# Patient Record
Sex: MALE | Race: BLACK OR AFRICAN AMERICAN | NOT HISPANIC OR LATINO | Employment: UNEMPLOYED | URBAN - METROPOLITAN AREA
[De-identification: names, ages, dates, MRNs, and addresses within clinical notes are randomized per-mention and may not be internally consistent; named-entity substitution may affect disease eponyms.]

---

## 2018-02-13 ENCOUNTER — HOSPITAL ENCOUNTER (EMERGENCY)
Facility: HOSPITAL | Age: 58
Discharge: LEFT BEFORE DISCHARGE | End: 2018-02-13
Attending: EMERGENCY MEDICINE
Payer: COMMERCIAL

## 2018-02-13 ENCOUNTER — APPOINTMENT (EMERGENCY)
Dept: CT IMAGING | Facility: HOSPITAL | Age: 58
End: 2018-02-13
Payer: COMMERCIAL

## 2018-02-13 ENCOUNTER — APPOINTMENT (EMERGENCY)
Dept: RADIOLOGY | Facility: HOSPITAL | Age: 58
End: 2018-02-13
Payer: COMMERCIAL

## 2018-02-13 VITALS
WEIGHT: 171.3 LBS | OXYGEN SATURATION: 97 % | SYSTOLIC BLOOD PRESSURE: 185 MMHG | HEART RATE: 97 BPM | TEMPERATURE: 100.4 F | RESPIRATION RATE: 18 BRPM | DIASTOLIC BLOOD PRESSURE: 99 MMHG | BODY MASS INDEX: 25.37 KG/M2 | HEIGHT: 69 IN

## 2018-02-13 DIAGNOSIS — R93.89 ABNORMAL CT SCAN: ICD-10-CM

## 2018-02-13 DIAGNOSIS — R50.9 FEVER: Primary | ICD-10-CM

## 2018-02-13 DIAGNOSIS — I10 HYPERTENSION: ICD-10-CM

## 2018-02-13 DIAGNOSIS — J20.9 ACUTE BRONCHITIS WITH BRONCHOSPASM: ICD-10-CM

## 2018-02-13 DIAGNOSIS — R91.1 PULMONARY NODULE: ICD-10-CM

## 2018-02-13 DIAGNOSIS — R01.1 HEART MURMUR: ICD-10-CM

## 2018-02-13 DIAGNOSIS — R79.89 ELEVATED BRAIN NATRIURETIC PEPTIDE (BNP) LEVEL: ICD-10-CM

## 2018-02-13 LAB
ALBUMIN SERPL BCP-MCNC: 3.5 G/DL (ref 3.5–5)
ALP SERPL-CCNC: 100 U/L (ref 46–116)
ALT SERPL W P-5'-P-CCNC: 32 U/L (ref 12–78)
ANION GAP SERPL CALCULATED.3IONS-SCNC: 12 MMOL/L (ref 4–13)
APTT PPP: 34 SECONDS (ref 23–35)
AST SERPL W P-5'-P-CCNC: 45 U/L (ref 5–45)
ATRIAL RATE: 104 BPM
BASOPHILS # BLD AUTO: 0.03 THOUSANDS/ΜL (ref 0–0.1)
BASOPHILS NFR BLD AUTO: 1 % (ref 0–1)
BILIRUB DIRECT SERPL-MCNC: 0.14 MG/DL (ref 0–0.2)
BILIRUB SERPL-MCNC: 0.4 MG/DL (ref 0.2–1)
BILIRUB UR QL STRIP: NEGATIVE
BUN SERPL-MCNC: 6 MG/DL (ref 5–25)
CALCIUM SERPL-MCNC: 9.3 MG/DL (ref 8.3–10.1)
CHLORIDE SERPL-SCNC: 98 MMOL/L (ref 100–108)
CLARITY UR: CLEAR
CO2 SERPL-SCNC: 22 MMOL/L (ref 21–32)
COLOR UR: YELLOW
CREAT SERPL-MCNC: 1.02 MG/DL (ref 0.6–1.3)
EOSINOPHIL # BLD AUTO: 0.01 THOUSAND/ΜL (ref 0–0.61)
EOSINOPHIL NFR BLD AUTO: 0 % (ref 0–6)
ERYTHROCYTE [DISTWIDTH] IN BLOOD BY AUTOMATED COUNT: 15.1 % (ref 11.6–15.1)
ETHANOL SERPL-MCNC: <3 MG/DL (ref 0–3)
GFR SERPL CREATININE-BSD FRML MDRD: 95 ML/MIN/1.73SQ M
GLUCOSE SERPL-MCNC: 107 MG/DL (ref 65–140)
GLUCOSE UR STRIP-MCNC: NEGATIVE MG/DL
HCT VFR BLD AUTO: 39.4 % (ref 36.5–49.3)
HGB BLD-MCNC: 13.5 G/DL (ref 12–17)
HGB UR QL STRIP.AUTO: NEGATIVE
INR PPP: 0.98 (ref 0.86–1.16)
KETONES UR STRIP-MCNC: NEGATIVE MG/DL
LACTATE SERPL-SCNC: 1.2 MMOL/L (ref 0.5–2)
LEUKOCYTE ESTERASE UR QL STRIP: NEGATIVE
LIPASE SERPL-CCNC: 212 U/L (ref 73–393)
LYMPHOCYTES # BLD AUTO: 0.73 THOUSANDS/ΜL (ref 0.6–4.47)
LYMPHOCYTES NFR BLD AUTO: 18 % (ref 14–44)
MCH RBC QN AUTO: 29.1 PG (ref 26.8–34.3)
MCHC RBC AUTO-ENTMCNC: 34.3 G/DL (ref 31.4–37.4)
MCV RBC AUTO: 85 FL (ref 82–98)
MONOCYTES # BLD AUTO: 0.28 THOUSAND/ΜL (ref 0.17–1.22)
MONOCYTES NFR BLD AUTO: 7 % (ref 4–12)
NEUTROPHILS # BLD AUTO: 3 THOUSANDS/ΜL (ref 1.85–7.62)
NEUTS SEG NFR BLD AUTO: 74 % (ref 43–75)
NITRITE UR QL STRIP: NEGATIVE
NRBC BLD AUTO-RTO: 0 /100 WBCS
NT-PROBNP SERPL-MCNC: 1967 PG/ML
P AXIS: 16 DEGREES
PH UR STRIP.AUTO: 6.5 [PH] (ref 4.5–8)
PLATELET # BLD AUTO: 173 THOUSANDS/UL (ref 149–390)
PMV BLD AUTO: 10.3 FL (ref 8.9–12.7)
POTASSIUM SERPL-SCNC: 3.6 MMOL/L (ref 3.5–5.3)
PR INTERVAL: 152 MS
PROT SERPL-MCNC: 7.4 G/DL (ref 6.4–8.2)
PROT UR STRIP-MCNC: NEGATIVE MG/DL
PROTHROMBIN TIME: 13.2 SECONDS (ref 12.1–14.4)
QRS AXIS: -1 DEGREES
QRSD INTERVAL: 82 MS
QT INTERVAL: 340 MS
QTC INTERVAL: 447 MS
RBC # BLD AUTO: 4.64 MILLION/UL (ref 3.88–5.62)
SODIUM SERPL-SCNC: 132 MMOL/L (ref 136–145)
SP GR UR STRIP.AUTO: <=1.005 (ref 1–1.03)
T WAVE AXIS: 87 DEGREES
TROPONIN I SERPL-MCNC: <0.02 NG/ML
UROBILINOGEN UR QL STRIP.AUTO: 0.2 E.U./DL
VENTRICULAR RATE: 104 BPM
WBC # BLD AUTO: 4.06 THOUSAND/UL (ref 4.31–10.16)

## 2018-02-13 PROCEDURE — 85610 PROTHROMBIN TIME: CPT | Performed by: PHYSICIAN ASSISTANT

## 2018-02-13 PROCEDURE — 85025 COMPLETE CBC W/AUTO DIFF WBC: CPT | Performed by: PHYSICIAN ASSISTANT

## 2018-02-13 PROCEDURE — 84484 ASSAY OF TROPONIN QUANT: CPT | Performed by: PHYSICIAN ASSISTANT

## 2018-02-13 PROCEDURE — 36415 COLL VENOUS BLD VENIPUNCTURE: CPT | Performed by: PHYSICIAN ASSISTANT

## 2018-02-13 PROCEDURE — 80048 BASIC METABOLIC PNL TOTAL CA: CPT | Performed by: PHYSICIAN ASSISTANT

## 2018-02-13 PROCEDURE — 81003 URINALYSIS AUTO W/O SCOPE: CPT | Performed by: PHYSICIAN ASSISTANT

## 2018-02-13 PROCEDURE — 74177 CT ABD & PELVIS W/CONTRAST: CPT

## 2018-02-13 PROCEDURE — 83880 ASSAY OF NATRIURETIC PEPTIDE: CPT | Performed by: PHYSICIAN ASSISTANT

## 2018-02-13 PROCEDURE — 93010 ELECTROCARDIOGRAM REPORT: CPT | Performed by: INTERNAL MEDICINE

## 2018-02-13 PROCEDURE — 71046 X-RAY EXAM CHEST 2 VIEWS: CPT

## 2018-02-13 PROCEDURE — 83690 ASSAY OF LIPASE: CPT | Performed by: PHYSICIAN ASSISTANT

## 2018-02-13 PROCEDURE — 96367 TX/PROPH/DG ADDL SEQ IV INF: CPT

## 2018-02-13 PROCEDURE — 87040 BLOOD CULTURE FOR BACTERIA: CPT | Performed by: PHYSICIAN ASSISTANT

## 2018-02-13 PROCEDURE — 96375 TX/PRO/DX INJ NEW DRUG ADDON: CPT

## 2018-02-13 PROCEDURE — 96366 THER/PROPH/DIAG IV INF ADDON: CPT

## 2018-02-13 PROCEDURE — 80076 HEPATIC FUNCTION PANEL: CPT | Performed by: PHYSICIAN ASSISTANT

## 2018-02-13 PROCEDURE — 71275 CT ANGIOGRAPHY CHEST: CPT

## 2018-02-13 PROCEDURE — 87798 DETECT AGENT NOS DNA AMP: CPT | Performed by: PHYSICIAN ASSISTANT

## 2018-02-13 PROCEDURE — 80320 DRUG SCREEN QUANTALCOHOLS: CPT | Performed by: PHYSICIAN ASSISTANT

## 2018-02-13 PROCEDURE — 93005 ELECTROCARDIOGRAM TRACING: CPT

## 2018-02-13 PROCEDURE — 99284 EMERGENCY DEPT VISIT MOD MDM: CPT

## 2018-02-13 PROCEDURE — 96365 THER/PROPH/DIAG IV INF INIT: CPT

## 2018-02-13 PROCEDURE — 83605 ASSAY OF LACTIC ACID: CPT | Performed by: PHYSICIAN ASSISTANT

## 2018-02-13 PROCEDURE — 85730 THROMBOPLASTIN TIME PARTIAL: CPT | Performed by: PHYSICIAN ASSISTANT

## 2018-02-13 RX ORDER — CEFDINIR 300 MG/1
300 CAPSULE ORAL EVERY 12 HOURS SCHEDULED
Qty: 20 CAPSULE | Refills: 0 | Status: SHIPPED | OUTPATIENT
Start: 2018-02-13 | End: 2018-02-23

## 2018-02-13 RX ORDER — ACETAMINOPHEN 325 MG/1
650 TABLET ORAL ONCE
Status: COMPLETED | OUTPATIENT
Start: 2018-02-13 | End: 2018-02-13

## 2018-02-13 RX ORDER — ONDANSETRON 2 MG/ML
4 INJECTION INTRAMUSCULAR; INTRAVENOUS ONCE
Status: COMPLETED | OUTPATIENT
Start: 2018-02-13 | End: 2018-02-13

## 2018-02-13 RX ORDER — PROMETHAZINE HYDROCHLORIDE AND CODEINE PHOSPHATE 6.25; 1 MG/5ML; MG/5ML
5 SYRUP ORAL EVERY 4 HOURS PRN
Qty: 120 ML | Refills: 0 | Status: SHIPPED | OUTPATIENT
Start: 2018-02-13 | End: 2018-02-23

## 2018-02-13 RX ORDER — LEVOFLOXACIN 750 MG/1
750 TABLET ORAL EVERY 24 HOURS
Qty: 7 TABLET | Refills: 0 | Status: SHIPPED | OUTPATIENT
Start: 2018-02-13 | End: 2018-02-20

## 2018-02-13 RX ORDER — ALBUTEROL SULFATE 90 UG/1
2 AEROSOL, METERED RESPIRATORY (INHALATION) EVERY 4 HOURS PRN
Qty: 1 INHALER | Refills: 0 | Status: SHIPPED | OUTPATIENT
Start: 2018-02-13

## 2018-02-13 RX ADMIN — SODIUM CHLORIDE 2331 ML: 0.9 INJECTION, SOLUTION INTRAVENOUS at 16:26

## 2018-02-13 RX ADMIN — IOHEXOL 100 ML: 350 INJECTION, SOLUTION INTRAVENOUS at 17:55

## 2018-02-13 RX ADMIN — ACETAMINOPHEN 650 MG: 325 TABLET, FILM COATED ORAL at 16:34

## 2018-02-13 RX ADMIN — ONDANSETRON 4 MG: 2 INJECTION INTRAMUSCULAR; INTRAVENOUS at 16:36

## 2018-02-13 RX ADMIN — CEFTRIAXONE 1000 MG: 1 INJECTION, SOLUTION INTRAVENOUS at 16:39

## 2018-02-13 RX ADMIN — HYDROMORPHONE HYDROCHLORIDE 0.2 MG: 1 INJECTION, SOLUTION INTRAMUSCULAR; INTRAVENOUS; SUBCUTANEOUS at 16:38

## 2018-02-13 RX ADMIN — AZITHROMYCIN MONOHYDRATE 500 MG: 500 INJECTION, POWDER, LYOPHILIZED, FOR SOLUTION INTRAVENOUS at 17:26

## 2018-02-13 NOTE — ED PROVIDER NOTES
History  Chief Complaint   Patient presents with    Flu Symptoms     Patient c/o cough over the last several weeks  Patient c/o flu symptoms and generalized body aches  Patient denies having a flu shot  53-year-old male with no significant past medical history presents to the emergency department with chief complaint of cough, congestion and flu-like symptoms over the past 6 weeks  Onset of symptoms reported as 6 weeks ago  Location of symptoms reported primarily as the chest   Quality was reported as cough with congestion  Severity reported as moderate  Associated symptoms:  Positive for cough  Positive for myalgias  Positive for tactile fevers  Positive for chills  Positive for nausea and vomiting  Positive for diarrhea  Positive for headaches  Positive for sore throat  Denies joint swelling or redness  Modifying factors:  Patient reports supportive care including Tylenol at home has not improved symptoms  Context:  Patient reports positive sick contacts at work recently  Denies recent travel outside the country  A friend who accompanied patient to the emergency department reports that the patient has been drinking alcohol daily and continues to drink alcohol  He thinks this may be related to his current symptoms  Medical summary:  Review of past visits via [place] demonstrates no prior visits to this emergency department  History provided by:  Patient and friend   used: No    Flu Symptoms   Presenting symptoms: cough, diarrhea, fatigue, fever, headache, myalgias, nausea, rhinorrhea, shortness of breath, sore throat and vomiting    Associated symptoms: chills and nasal congestion    Associated symptoms: no ear pain and no neck stiffness        None       History reviewed  No pertinent past medical history  History reviewed  No pertinent surgical history  History reviewed  No pertinent family history    I have reviewed and agree with the history as documented  Social History   Substance Use Topics    Smoking status: Former Smoker    Smokeless tobacco: Never Used    Alcohol use No        Review of Systems   Constitutional: Positive for appetite change, chills, diaphoresis, fatigue and fever  Negative for activity change and unexpected weight change  HENT: Positive for congestion, postnasal drip, rhinorrhea, sinus pressure and sore throat  Negative for dental problem, drooling, ear discharge, ear pain, facial swelling, hearing loss, mouth sores, nosebleeds, sinus pain, sneezing, tinnitus, trouble swallowing and voice change  Eyes: Negative for photophobia, pain, discharge, redness, itching and visual disturbance  Respiratory: Positive for cough, chest tightness, shortness of breath and wheezing  Negative for apnea, choking and stridor  Cardiovascular: Negative for chest pain, palpitations and leg swelling  Gastrointestinal: Positive for diarrhea, nausea and vomiting  Negative for abdominal distention, abdominal pain, anal bleeding, blood in stool, constipation and rectal pain  Endocrine: Negative for cold intolerance, heat intolerance, polydipsia, polyphagia and polyuria  Genitourinary: Negative for difficulty urinating, dysuria, flank pain, frequency, hematuria and urgency  Musculoskeletal: Positive for arthralgias and myalgias  Negative for back pain, gait problem, joint swelling, neck pain and neck stiffness  Skin: Negative for color change, pallor, rash and wound  Allergic/Immunologic: Negative for environmental allergies, food allergies and immunocompromised state  Neurological: Positive for weakness and headaches  Negative for dizziness, tremors, seizures, syncope, facial asymmetry, speech difficulty, light-headedness and numbness  Hematological: Negative for adenopathy  Does not bruise/bleed easily  Psychiatric/Behavioral: Negative for agitation, confusion and hallucinations  The patient is nervous/anxious      All other systems reviewed and are negative  Physical Exam  ED Triage Vitals [02/13/18 1550]   Temperature Pulse Respirations Blood Pressure SpO2   100 4 °F (38 °C) (!) 121 20 (!) 190/102 99 %      Temp Source Heart Rate Source Patient Position - Orthostatic VS BP Location FiO2 (%)   Oral Monitor Lying Right arm --      Pain Score       --           Orthostatic Vital Signs  Vitals:    02/13/18 1550 02/13/18 1642 02/13/18 1729   BP: (!) 190/102 (!) 188/86 (!) 185/99   Pulse: (!) 121 102 97   Patient Position - Orthostatic VS: Lying Lying Lying       Physical Exam   Constitutional: He is oriented to person, place, and time  He appears well-developed and well-nourished  No distress  BP (!) 188/86 (BP Location: Left arm)   Pulse 102   Temp 100 4 °F (38 °C) (Oral)   Resp 20   Ht 5' 9" (1 753 m)   Wt 77 7 kg (171 lb 4 8 oz)   SpO2 100%   BMI 25 30 kg/m²     Blood pressure hypertensive  Pulse tachycardic  Patient afebrile  Patient observed  IV fluids ordered  HENT:   Head: Normocephalic and atraumatic  Right Ear: External ear normal    Left Ear: External ear normal    Nose: Nose normal    Mouth/Throat: No oropharyngeal exudate  Mucus membranes are dry, pink, yellow mucus drainage noted to posterior pharynx  Uvula midline without deviation  Eyes: Conjunctivae and EOM are normal  Pupils are equal, round, and reactive to light  Right eye exhibits no discharge  Left eye exhibits no discharge  No scleral icterus  Neck: Normal range of motion  Neck supple  No tracheal deviation present  No thyromegaly present  Cardiovascular: S1 normal, S2 normal and intact distal pulses  Tachycardia present  Murmur heard   + MACI Left lower sternal border  3/4 murmer   Pulmonary/Chest: Effort normal  No stridor  No respiratory distress  He has wheezes  He has rales  He exhibits no tenderness  Breath sounds are present bilaterally  Coarse rhonchi with rales auscultated bilaterally    Expiratory wheezing noted scattered bilaterally  Abdominal: Soft  Bowel sounds are normal  He exhibits no distension and no mass  There is no tenderness  There is no rebound and no guarding  No hernia  Musculoskeletal: He exhibits tenderness  He exhibits no edema or deformity  Diffuse tenderness to the muscles of the upper and lower back as well as bilateral lower extremities  Lymphadenopathy:     He has no cervical adenopathy  Neurological: He is alert and oriented to person, place, and time  He displays normal reflexes  No cranial nerve deficit or sensory deficit  He exhibits normal muscle tone  Coordination normal    Skin: Skin is warm and dry  Capillary refill takes less than 2 seconds  No rash noted  He is not diaphoretic  No erythema  No pallor  Psychiatric: He has a normal mood and affect  His behavior is normal  Judgment and thought content normal    Nursing note and vitals reviewed        ED Medications  Medications   sodium chloride 0 9 % bolus 2,331 mL (0 mL/kg × 77 7 kg Intravenous Stopped 2/13/18 1902)   acetaminophen (TYLENOL) tablet 650 mg (650 mg Oral Given 2/13/18 1634)   ondansetron (ZOFRAN) injection 4 mg (4 mg Intravenous Given 2/13/18 1636)   HYDROmorphone (DILAUDID) injection 0 2 mg (0 2 mg Intravenous Given 2/13/18 1638)   ceftriaxone (ROCEPHIN) 1 g/50 mL in dextrose IVPB (0 mg Intravenous Stopped 2/13/18 1709)   azithromycin (ZITHROMAX) 500 mg in sodium chloride 0 9% 250mL IVPB 500 mg (0 mg Intravenous Stopped 2/13/18 1902)   iohexol (OMNIPAQUE) 350 MG/ML injection (MULTI-DOSE) 100 mL (100 mL Intravenous Given 2/13/18 1755)       Diagnostic Studies  Results Reviewed     Procedure Component Value Units Date/Time    Lipase [[id number]]  (Normal) Collected:  02/13/18 1618    Lab Status:  Final result Specimen:  Blood from Arm, Right Updated:  02/13/18 1719     Lipase 212 u/L     Basic metabolic panel [[id number]]  (Abnormal) Collected:  02/13/18 1618    Lab Status:  Final result Specimen:  Blood from Arm, Right Updated:  02/13/18 1708     Sodium 132 (L) mmol/L      Potassium 3 6 mmol/L      Chloride 98 (L) mmol/L      CO2 22 mmol/L      Anion Gap 12 mmol/L      BUN 6 mg/dL      Creatinine 1 02 mg/dL      Glucose 107 mg/dL      Calcium 9 3 mg/dL      eGFR 95 ml/min/1 73sq m     Narrative:         National Kidney Disease Education Program recommendations are as follows:  GFR calculation is accurate only with a steady state creatinine  Chronic Kidney disease less than 60 ml/min/1 73 sq  meters  Kidney failure less than 15 ml/min/1 73 sq  meters  Hepatic function panel [[id number]]  (Normal) Collected:  02/13/18 1618    Lab Status:  Final result Specimen:  Blood from Arm, Right Updated:  02/13/18 1708     Total Bilirubin 0 40 mg/dL      Bilirubin, Direct 0 14 mg/dL      Alkaline Phosphatase 100 U/L      AST 45 U/L      ALT 32 U/L      Total Protein 7 4 g/dL      Albumin 3 5 g/dL     B-type natriuretic peptide [[id number]]  (Abnormal) Collected:  02/13/18 1618    Lab Status:  Final result Specimen:  Blood from Arm, Right Updated:  02/13/18 1708     NT-proBNP 1,967 (H) pg/mL     Troponin I [[id number]]  (Normal) Collected:  02/13/18 1618    Lab Status:  Final result Specimen:  Blood from Arm, Right Updated:  02/13/18 1658     Troponin I <0 02 ng/mL     Narrative:         [employer] Chemistry analyzer 99% cutoff is > 0 04 ng/mL in network labs    o cTnI 99% cutoff is useful only when applied to patients in the clinical setting of myocardial ischemia  o cTnI 99% cutoff should be interpreted in the context of clinical history, ECG findings and possibly cardiac imaging to establish correct diagnosis  o cTnI 99% cutoff may be suggestive but clearly not indicative of a coronary event without the clinical setting of myocardial ischemia      Lactic acid, plasma [[id number]]  (Normal) Collected:  02/13/18 1618    Lab Status:  Final result Specimen:  Blood from Arm, Right Updated:  02/13/18 1657     LACTIC ACID 1 2 mmol/L     Narrative: Result may be elevated if tourniquet was used during collection  Ethanol [[id number]]  (Normal) Collected:  02/13/18 1618    Lab Status:  Final result Specimen:  Blood from Arm, Right Updated:  02/13/18 1649     Ethanol Lvl <3 mg/dL     Protime-INR [[id number]]  (Normal) Collected:  02/13/18 1618    Lab Status:  Final result Specimen:  Blood from Arm, Right Updated:  02/13/18 1648     Protime 13 2 seconds      INR 0 98    APTT [[id number]]  (Normal) Collected:  02/13/18 1618    Lab Status:  Final result Specimen:  Blood from Arm, Right Updated:  02/13/18 1648     PTT 34 seconds     Narrative: Therapeutic Heparin Range = 60-90 seconds    CBC and differential [[id number]]  (Abnormal) Collected:  02/13/18 1618    Lab Status:  Final result Specimen:  Blood from Arm, Right Updated:  02/13/18 1634     WBC 4 06 (L) Thousand/uL      RBC 4 64 Million/uL      Hemoglobin 13 5 g/dL      Hematocrit 39 4 %      MCV 85 fL      MCH 29 1 pg      MCHC 34 3 g/dL      RDW 15 1 %      MPV 10 3 fL      Platelets 143 Thousands/uL      nRBC 0 /100 WBCs      Neutrophils Relative 74 %      Lymphocytes Relative 18 %      Monocytes Relative 7 %      Eosinophils Relative 0 %      Basophils Relative 1 %      Neutrophils Absolute 3 00 Thousands/µL      Lymphocytes Absolute 0 73 Thousands/µL      Monocytes Absolute 0 28 Thousand/µL      Eosinophils Absolute 0 01 Thousand/µL      Basophils Absolute 0 03 Thousands/µL     Blood culture #2 [[id number]] Collected:  02/13/18 1624    Lab Status: In process Specimen:  Blood from Arm, Left Updated:  02/13/18 1632    Blood culture #1 [[id number]] Collected:  02/13/18 1618    Lab Status: In process Specimen:  Blood from Arm, Right Updated:  02/13/18 1632    Influenza A/B and RSV by PCR (indicated for patients >2 mo of age) [[id number]] Collected:  02/13/18 1624    Lab Status:   In process Specimen:  Nasopharyngeal from Nasopharyngeal Swab Updated:  02/13/18 1631    UA w Reflex to Microscopic w Reflex to Culture [[id number]]     Lab Status:  No result Specimen:  Urine                  PE Study with CT Abdomen and Pelvis with contrast   Final Result by Anshul Purcell MD (02/13 1813)         1  No acute cardiopulmonary process  2   Indeterminate 4 mm pulmonary nodule in the right middle lobe Based on current Fleischner Society 2017 Guidelines on incidental pulmonary nodule, no routine follow-up is needed if the patient is considered low risk for lung cancer  If the patient is    considered high risk for lung cancer, 12 month follow-up non-contrast chest CT is recommended  3   No acute intra-abdominal or pelvic process  Workstation performed: [id number]         XR chest 2 views   Final Result by Vahe Sanchez MD (02/13 1703)      No active pulmonary disease  Prominent right paratracheal stripe could be from brachiocephalic vessels or related to the thyroid, consider nonemergent outpatient lower neck ultrasound        Workstation performed: [id number]                    Procedures  ECG 12 Lead Documentation  Date/Time: 2/13/2018 4:42 PM  Performed by: Sebastián Price  Authorized by: [facility]     Indications / Diagnosis:  Tachycardia  ECG reviewed by me, the ED Provider: yes    Patient location:  ED  Previous ECG:     Previous ECG:  Unavailable    Comparison to cardiac monitor: Yes    Interpretation:     Interpretation: normal    Rate:     ECG rate:  104    ECG rate assessment: tachycardic    Rhythm:     Rhythm: sinus tachycardia    Ectopy:     Ectopy: none    QRS:     QRS axis:  Normal    QRS intervals:  Normal  Conduction:     Conduction: normal    ST segments:     ST segments:  Normal  T waves:     T waves: normal    Other findings:     Other findings: LVH               Phone Contacts  ED Phone Contact    ED Course  ED Course                                MDM  Number of Diagnoses or Management Options  Diagnosis management comments: Differential diagnosis includes but is not limited to influenza, dehydration, pneumonia, bronchitis, delirium tremens, withdrawal, cirrhosis, pleural effusion, pericardial effusion, pulmonary edema pulmonary embolism, endocarditis,  consider atypical pneumonia;    planned workup including labs, IV fluids, chest x-ray, EKG  Lab results reviewed  CBC remarkable for white blood cell count of 4 0 which is slightly low  Hemoglobin and hematocrit are normal   INR normal at 0 98  Basic metabolic panel reviewed remarkable for sodium slightly low at 132  Chloride low at 98  Normal BUN and creatinine  Hepatic function panel reviewed and unremarkable  Troponin normal at less than 0 02  BNP is elevated 1967  Ethanol is normal less than 3  Lipase normal at 212  Chest x-ray images independently visualized and interpreted by me  No acute infiltrate or pneumothorax  Radiology report was reviewed demonstrating No active pulmonary disease  Prominent right paratracheal stripe could be from brachiocephalic vessels or related to the thyroid, consider nonemergent outpatient lower neck ultrasound  Will proceed with CT scan of the chest abdomen and pelvis for further evaluation given patient's extensive symptoms over the past 6 weeks, abnormal vital signs on arrival including tachycardia and concerns for possible pulmonary embolism  CT scan of the chest abdomen and pelvis images visualized by me  Radiology report was reviewed:PULMONARY ARTERIAL TREE:  No pulmonary embolus is seen  LUNGS:  Indeterminate 4 mm pulmonary nodule in the right middle lobe adjacent to the minor fissure   No airspace consolidation, pulmonary edema or pneumothorax   There is no tracheal or endobronchial lesion  PLEURA:  Unremarkable  HEART/AORTA:  Unremarkable for patient's age  MEDIASTINUM AND LINDA:  Unremarkable  CHEST WALL AND LOWER NECK: Normal     ABDOMEN    LIVER/BILIARY TREE:  No biliary obstruction  GALLBLADDER:  No calcified gallstones   No pericholecystic inflammatory change  SPLEEN:  Unremarkable  PANCREAS:  Unremarkable  ADRENAL GLANDS: Unremarkable  KIDNEYS/URETERS:  No evidence of pyelonephritis or obstructive uropathy  STOMACH AND BOWEL:  No bowel obstruction, colitis or diverticulitis  APPENDIX:  A normal appendix was visualized  ABDOMINOPELVIC CAVITY:  No ascites or free intraperitoneal air  No lymphadenopathy  VESSELS:  Unremarkable for patient's age  PELVIS    REPRODUCTIVE ORGANS:  Unremarkable for patient's age  URINARY BLADDER:  Unremarkable  ABDOMINAL WALL/INGUINAL REGIONS:  Unremarkable  OSSEOUS STRUCTURES:  No acute fracture or destructive osseous lesion  1852: Re-evaluation I reviewed patient's workup including CT scan results, chest x-ray results, EKG and lab work with the patient  ED course:  Patient received 30 cc/kilogram of IV fluids in addition to initial antibiotic coverage for concerns of sepsis  Initial workup remarkable for significantly elevated BNP at 1967  Normal lactate, normal troponin  Patient does have audible murmur on examination  He reports no history of murmur in the past   He denies any IV drug abuse  His symptoms seem to be too prolonged for influenza although this is in the differential   No acute pneumonia or other source for infection noted on workup  After IV fluid administration patient was still tachycardic at 104  Febrile to 100 4 in the emergency department  Patient remains hypertensive at 190/102  His oxygen saturations 99% on room air  I discussed with patient given the no source for symptoms identified in addition to fever and no murmur that I was concerned about possible endocarditis  I discussed with patient my recommendation of admission to the hospital for further workup and evaluation of source of fevers and symptoms specifically given elevated BNP    Patient declined admission to the hospital stating that he felt that he needed to go home that he could not stay  He states he needs to be home in order to take care of his family  I discussed with him the risks of leaving against medical advice including but not limited to death, CVA, TIA, myocardial infarction, pulmonary infarction, sepsis, disability, acute chronic pain and death  Patient verbalized understanding of risks of leaving AMA  He demonstrated clinical competence to understand these risks during his time here in the emergency department  I have discussed with him will treat with outpatient antibiotics and have him follow up with primary care physician as soon as possible  I discussed with him that he can return at any time for completion of his workup and admission for further evaluation of symptoms  I did discuss with him at this time that he does have 2 blood cultures and a flu test still pending at the time of discharge and he will receive a phone call if these return positive  Discussed outpatient follow-up with primary care physician and Cardiology  I again discussed with patient he should return at any time for any reason for further evaluation and completion of workup and admission  Patient verbalized understanding of these instructions and agreement with plan but still wishes to leave against medical advice         Amount and/or Complexity of Data Reviewed  Clinical lab tests: ordered and reviewed  Tests in the radiology section of CPT®: ordered and reviewed  Tests in the medicine section of CPT®: ordered and reviewed  Discussion of test results with the performing providers: yes  Obtain history from someone other than the patient: yes (Friend at bedside)  Review and summarize past medical records: yes  Independent visualization of images, tracings, or specimens: yes    Patient Progress  Patient progress: (unchanged)    CritCare Time    Disposition  Final diagnoses:   Fever   Heart murmur   Hypertension   Elevated brain natriuretic peptide (BNP) level   Abnormal CT scan   Pulmonary nodule   Acute bronchitis with bronchospasm     Time reflects when diagnosis was documented in both MDM as applicable and the Disposition within this note     Time User Action Codes Description Comment    2/13/2018  6:57 PM Rivera Neat Add [R50 9] Fever     2/13/2018  6:57 PM Rivera Neat Add [R01 1] Heart murmur     2/13/2018  6:57 PM Galilea Clayton [I10] Hypertension     2/13/2018  6:57 PM Rivera Neat Add [R79 89] Elevated brain natriuretic peptide (BNP) level     2/13/2018  6:58 PM Rivera Neat Add [R93 8] Abnormal CT scan     2/13/2018  6:58 PM Rivera Neat Add [R91 1] Pulmonary nodule     2/13/2018  7:08 PM Rivera Neat Add [J20 9] Acute bronchitis with bronchospasm       ED Disposition     ED Disposition Condition Comment    AMA  Date: 2/13/2018  Patient: Varghese Thompson  Admitted: 2/13/2018  3:41 PM  Attending Provider: Jagjit Barth MD    Varghese Thompson or his authorized caregiver has made the decision for the patient to leave the emergency department against the [facility]  He or his authorized caregiver has been informed and understands the inherent risks, including but not limited to death, disability, stroke, heart attack, sepsis  He or his authorized caregiver has decided to accept the responsibility for  this decision  [place] and all necessary parties have been advised that he may return for further evaluation or treatment  His condition at time of discharge was guarded    Thomes South had current vital signs as follows:  BP (!) 185/99 (BP Location: L eft arm)   Pulse 97   Temp 100 4 °F (38 °C) (Oral)   Resp 18   Ht 5' 9" (1 753 m)   Wt 77 7 kg (171 lb 4 8 oz)         Follow-up Information     Follow up With Specialties Details Why Contact Info Additional Information    Choco Null DO Family Medicine Call in 1 day for further evaluation of symptoms [id number] [address] Emergency Department Emergency Medicine Go to for further evaluation of symptoms [address] [facility]  [address] [facility] [address]  [address] [place] [address] ED, [address] [place], [place], [address]    Rico Snider MD Cardiology Call in 1 day for further evaluation of symptoms [address] [facility] [address] [facility] Emergency Department Emergency Medicine Go to If symptoms worsen, for further evaluation of symptoms [address] [facility]  Christa Culver  [facility] [address]  [phone] [place] ED, [address] [place], [place], [address]        Patient's Medications   Discharge Prescriptions    ALBUTEROL (PROVENTIL HFA,VENTOLIN HFA) 90 MCG/ACT INHALER    Inhale 2 puffs every 4 (four) hours as needed for wheezing       Start Date: 2/13/2018 End Date: --       Order Dose: 2 puffs       Quantity: 1 Inhaler    Refills: 0    CEFDINIR (OMNICEF) 300 MG CAPSULE    Take 1 capsule (300 mg total) by mouth every 12 (twelve) hours for 10 days       Start Date: 2/13/2018 End Date: 2/23/2018       Order Dose: 300 mg       Quantity: 20 capsule    Refills: 0    LEVOFLOXACIN (LEVAQUIN) 750 MG TABLET    Take 1 tablet (750 mg total) by mouth every 24 hours for 7 days       Start Date: 2/13/2018 End Date: 2/20/2018       Order Dose: 750 mg       Quantity: 7 tablet    Refills: 0    PROMETHAZINE-CODEINE (PHENERGAN WITH CODEINE) 6 25-10 MG/5 ML SYRUP    Take 5 mL by mouth every 4 (four) hours as needed for cough for up to 10 days       Start Date: 2/13/2018 End Date: 2/23/2018       Order Dose: 5 mL       Quantity: 120 mL    Refills: 0     No discharge procedures on file      ED Provider  Electronically Signed by           Salomón Mathews PA-C  02/13/18 2678

## 2018-02-14 LAB
FLUAV AG SPEC QL: DETECTED
FLUBV AG SPEC QL: ABNORMAL
RSV B RNA SPEC QL NAA+PROBE: ABNORMAL

## 2018-02-18 LAB
BACTERIA BLD CULT: NORMAL
BACTERIA BLD CULT: NORMAL

## 2019-12-19 NOTE — DISCHARGE INSTRUCTIONS
Acute Bronchitis   WHAT YOU NEED TO KNOW:   Acute bronchitis is swelling and irritation in the air passages of your lungs  This irritation may cause you to cough or have other breathing problems  Acute bronchitis often starts because of another illness, such as a cold or the flu  The illness spreads from your nose and throat to your windpipe and airways  Bronchitis is often called a chest cold  Acute bronchitis lasts about 3 to 6 weeks and is usually not a serious illness  Your cough can last for several weeks  DISCHARGE INSTRUCTIONS:   Return to the emergency department if:   · You cough up blood  · Your lips or fingernails turn blue  · You feel like you are not getting enough air when you breathe  Contact your healthcare provider if:   · You have a fever  · Your breathing problems do not go away or get worse  · Your cough does not get better within 4 weeks  · You have questions or concerns about your condition or care  Self-care:   · Get more rest   Rest helps your body to heal  Slowly start to do more each day  Rest when you feel it is needed  · Avoid irritants in the air  Avoid chemicals, fumes, and dust  Wear a face mask if you must work around dust or fumes  Stay inside on days when air pollution levels are high  If you have allergies, stay inside when pollen counts are high  Do not use aerosol products, such as spray-on deodorant, bug spray, and hair spray  · Do not smoke or be around others who smoke  Nicotine and other chemicals in cigarettes and cigars damages the cilia that move mucus out of your lungs  Ask your healthcare provider for information if you currently smoke and need help to quit  E-cigarettes or smokeless tobacco still contain nicotine  Talk to your healthcare provider before you use these products  · Drink liquids as directed  Liquids help keep your air passages moist and help you cough up mucus   You may need to drink more liquids when Refill approved as requested.   you have acute bronchitis  Ask how much liquid to drink each day and which liquids are best for you  · Use a humidifier or vaporizer  Use a cool mist humidifier or a vaporizer to increase air moisture in your home  This may make it easier for you to breathe and help decrease your cough  Decrease risk for acute bronchitis:   · Get the vaccinations you need  Ask your healthcare provider if you should get vaccinated against the flu or pneumonia  · Prevent the spread of germs  You can decrease your risk of acute bronchitis and other illnesses by doing the following:     [facility] your hands often with soap and water  Carry germ-killing hand lotion or gel with you  You can use the lotion or gel to clean your hands when soap and water are not available  ¨ Do not touch your eyes, nose, or mouth unless you have washed your hands first     ¨ Always cover your mouth when you cough to prevent the spread of germs  It is best to cough into a tissue or your shirt sleeve instead of into your hand  Ask those around you cover their mouths when they cough  ¨ Try to avoid people who have a cold or the flu  If you are sick, stay away from others as much as possible  Medicines: Your healthcare provider may  give you any of the following:  · Ibuprofen or acetaminophen  are medicines that help lower your fever  They are available without a doctor's order  Ask your healthcare provider which medicine is right for you  Ask how much to take and how often to take it  Follow directions  These medicines can cause stomach bleeding if not taken correctly  Ibuprofen can cause kidney damage  Do not take ibuprofen if you have kidney disease, an ulcer, or allergies to aspirin  Acetaminophen can cause liver damage  Do not take more than 4,000 milligrams in 24 hours  · Decongestants  help loosen mucus in your lungs and make it easier to cough up  This can help you breathe easier  · Cough suppressants  decrease your urge to cough  If your cough produces mucus, do not take a cough suppressant unless your healthcare provider tells you to  Your healthcare provider may suggest that you take a cough suppressant at night so you can rest     · Inhalers  may be given  Your healthcare provider may give you one or more inhalers to help you breathe easier and cough less  An inhaler gives your medicine to open your airways  Ask your healthcare provider to show you how to use your inhaler correctly  · Take your medicine as directed  Contact your healthcare provider if you think your medicine is not helping or if you have side effects  Tell him of her if you are allergic to any medicine  Keep a list of the medicines, vitamins, and herbs you take  Include the amounts, and when and why you take them  Bring the list or the pill bottles to follow-up visits  Carry your medicine list with you in case of an emergency  Follow up with your healthcare provider as directed:  Write down questions you have so you will remember to ask them during your follow-up visits  © 2017 [address]  Information is for End User's use only and may not be sold, redistributed or otherwise used for commercial purposes  All illustrations and images included in CareNotes® are the copyrighted property of [employer]  or [employer]  The above information is an  only  It is not intended as medical advice for individual conditions or treatments  Talk to your doctor, nurse or pharmacist before following any medical regimen to see if it is safe and effective for you  Fever in Adults   WHAT YOU NEED TO KNOW:   A fever is an increase in your body temperature  Normal body temperature is 98 6°F (37°C)  Fever is generally defined as greater than 100 4°F (38°C)  Common causes include an infection, injury, or disease such as arthritis    DISCHARGE INSTRUCTIONS:   Return to the emergency department if:   · Your fever does not go away or gets worse even after treatment  · You have a stiff neck and a bad headache  · You are confused  You may not be able to think clearly or remember things like you normally do  · Your heart beats faster than usual even after treatment  · You have shortness of breath or chest pain when you breathe  · You urinate small amounts or not at all  · Your skin, lips, or nails turn blue  Contact your healthcare provider if:   · You have abdominal pain or you feel bloated  · You have nausea or are vomiting  · You have pain or burning when you urinate, or you have pain in your back  · You have questions or concerns about your condition or care  Medicines: You may need any of the following:  · NSAIDs , such as ibuprofen, help decrease swelling, pain, and fever  This medicine is available with or without a doctor's order  NSAIDs can cause stomach bleeding or kidney problems in certain people  If you take blood thinner medicine, always ask if NSAIDs are safe for you  Always read the medicine label and follow directions  Do not give these medicines to children under 10months of age without direction from your child's healthcare provider  · Acetaminophen  decreases pain and fever  It is available without a doctor's order  Ask how much to take and how often to take it  Follow directions  Read the labels of all other medicines you are using to see if they also contain acetaminophen, or ask your doctor or pharmacist  Acetaminophen can cause liver damage if not taken correctly  Do not use more than 4 grams (4,000 milligrams) total of acetaminophen in one day  · Antibiotics  may be given if you have an infection caused by bacteria  · Take your medicine as directed  Contact your healthcare provider if you think your medicine is not helping or if you have side effects  Tell him of her if you are allergic to any medicine  Keep a list of the medicines, vitamins, and herbs you take   Include the amounts, and when and why you take them  Bring the list or the pill bottles to follow-up visits  Carry your medicine list with you in case of an emergency  Follow up with your healthcare provider as directed:  Write down your questions so you remember to ask them during your visits  Self-care:   · Drink more liquids as directed  A fever makes you sweat  This can increase your risk for dehydration  Liquids can help prevent dehydration  ¨ Drink at least 6 to 8 eight-ounce cups of clear liquids each day  Drink water, juice, or broth  Do not drink sports drinks  They may contain caffeine  ¨ Ask your healthcare provider if you should drink an oral rehydration solution (ORS)  An ORS has the right amounts of water, salts, and sugar you need to replace body fluids  · Dress in lightweight clothes  Shivers may be a sign that your fever is rising  Do not put extra blankets or clothes on  This may cause your fever to rise even higher  Dress in light, comfortable clothing  Use a lightweight blanket or sheet when you sleep  Change your clothes, blanket, or sheets if they get wet  · Cool yourself safely  Take a bath in cool or lukewarm water  Use an ice pack wrapped in a small towel or wet a washcloth with cool water  Place the ice pack or wet washcloth on your forehead or the back of your neck  © 2017 [facility] Information is for End User's use only and may not be sold, redistributed or otherwise used for commercial purposes  All illustrations and images included in CareNotes® are the copyrighted property of [employer]  or [employer]  The above information is an  only  It is not intended as medical advice for individual conditions or treatments  Talk to your doctor, nurse or pharmacist before following any medical regimen to see if it is safe and effective for you        Heart Murmur   WHAT YOU NEED TO KNOW:   A heart murmur is a sound heard between your heartbeats  A murmur may sound like a swish or whoosh noise  Heart murmurs may be soft or loud  Heart murmurs are common and are usually harmless  DISCHARGE INSTRUCTIONS:   Call 911 for any of the following:   · You have chest pain  · You have trouble breathing  Return to the emergency department if:   · You feel dizzy, lightheaded, or faint  · Your child has chest pain with exercise  · Your fingertips or lips are pale or blue  · You have palpitations, or a fast heartbeat  · You have sudden swelling in your limbs or weight gain  Contact your healthcare provider if:   · Your child has a poor appetite or will not eat  · You have a fever  · You have shortness of breath with activity  · You sweat heavily with little or no activity  · You have questions or concerns about your condition or care  Follow up with your healthcare provider as directed: You may be referred to a cardiologist  Write down your questions so you remember to ask them during your visits  Return to your usual activities as directed: You may be able to return to sports or other usual activities  Ask your healthcare provider if you have any restrictions  © 2017 [address] [facility] Information is for End User's use only and may not be sold, redistributed or otherwise used for commercial purposes  All illustrations and images included in CareNotes® are the copyrighted property of [employer]  or [employer]  The above information is an  only  It is not intended as medical advice for individual conditions or treatments  Talk to your doctor, nurse or pharmacist before following any medical regimen to see if it is safe and effective for you  Hypertension   WHAT YOU NEED TO KNOW:   Hypertension is high blood pressure (BP)  Your BP is the force of your blood moving against the walls of your arteries  Normal BP is less than 120/80  Prehypertension is between 120/80 and 139/89  Hypertension is 140/90 or higher  Hypertension causes your BP to get so high that your heart has to work much harder than normal  This can damage your heart  You can control hypertension with a healthy lifestyle or medicines  A controlled blood pressure helps protect your organs, such as your heart, lungs, brain, and kidneys  DISCHARGE INSTRUCTIONS:   Call 911 for any of the following:   · You have discomfort in your chest that feels like squeezing, pressure, fullness, or pain  · You become confused or have difficulty speaking  · You suddenly feel lightheaded or have trouble breathing  · You have pain or discomfort in your back, neck, jaw, stomach, or arm  Return to the emergency department if:   · You have a severe headache or vision loss  · You have weakness in an arm or leg  Contact your healthcare provider if:   · You feel faint, dizzy, confused, or drowsy  · You have been taking your BP medicine and your BP is still higher than your healthcare provider says it should be  · You have questions or concerns about your condition or care  Medicines: You may  need any of the following:  · Medicine  may be used to help lower your BP  You may need more than one type of medicine  Take the medicine exactly as directed  · Diuretics  help decrease extra fluid that collects in your body  This will help lower your BP  You may urinate more often while you take this medicine  · Cholesterol medicine  helps lower your cholesterol level  A low cholesterol level helps prevent heart disease and makes it easier to control your blood pressure  · Take your medicine as directed  Contact your healthcare provider if you think your medicine is not helping or if you have side effects  Tell him or her if you are allergic to any medicine  Keep a list of the medicines, vitamins, and herbs you take  Include the amounts, and when and why you take them  Bring the list or the pill bottles to follow-up visits  Carry your medicine list with you in case of an emergency  Follow up with your healthcare provider as directed: You will need to return to have your BP checked and to have other lab tests done  Write down your questions so you remember to ask them during your visits  Manage hypertension:  Talk with your healthcare provider about these and other ways to manage hypertension:  · Check your BP at home  Sit and rest for 5 minutes before you take your BP  Extend your arm and support it on a flat surface  Your arm should be at the same level as your heart  Follow the directions that came with your BP monitor  If possible, take at least 2 BP readings each time  Take your BP at least twice a day at the same times each day, such as morning and evening  Keep a record of your BP readings and bring it to your follow-up visits  Ask your healthcare provider what your BP should be  · Limit sodium (salt) as directed  Too much sodium can affect your fluid balance  Check labels to find low-sodium or no-salt-added foods  Some low-sodium foods use potassium salts for flavor  Too much potassium can also cause health problems  Your healthcare provider will tell you how much sodium and potassium are safe for you to have in a day  He or she may recommend that you limit sodium to 2,300 mg a day  · Follow the meal plan recommended by your healthcare provider  A dietitian or your provider can give you more information on low-sodium plans or the DASH (Dietary Approaches to Stop Hypertension) eating plan  The DASH plan is low in sodium, unhealthy fats, and total fat  It is high in potassium, calcium, and fiber  · Exercise to maintain a healthy weight  Exercise at least 30 minutes per day, on most days of the week  This will help decrease your blood pressure  Ask your healthcare provider about the best exercise plan for you  · Decrease stress  This may help lower your BP   Learn ways to relax, such as deep breathing or listening to music  · Limit alcohol  Women should limit alcohol to 1 drink a day  Men should limit alcohol to 2 drinks a day  A drink of alcohol is 12 ounces of beer, 5 ounces of wine, or 1½ ounces of liquor  · Do not smoke  Nicotine and other chemicals in cigarettes and cigars can increase your BP and also cause lung damage  Ask your healthcare provider for information if you currently smoke and need help to quit  E-cigarettes or smokeless tobacco still contain nicotine  Talk to your healthcare provider before you use these products  · Manage any other health conditions you have  Health conditions such as diabetes can increase your risk for hypertension  Follow your healthcare provider's instructions and take all your medicines as directed  © 2017 [address] [facility] Information is for End User's use only and may not be sold, redistributed or otherwise used for commercial purposes  All illustrations and images included in CareNotes® are the copyrighted property of [employer]  or [employer]  The above information is an  only  It is not intended as medical advice for individual conditions or treatments  Talk to your doctor, nurse or pharmacist before following any medical regimen to see if it is safe and effective for you  Pulmonary Nodules   WHAT YOU NEED TO KNOW:   Pulmonary nodules are areas of abnormal tissue in your lungs  You may not have any symptoms, or you may have chest tightness, a cough, chest pain, or shortness of breath  Nodules are usually found with an x-ray or CT scan  Most nodules are not cancerous  However, it is still important for you to return for follow-up testing to monitor your condition  DISCHARGE INSTRUCTIONS:   Call 911 for any of the following:   · You have severe shortness of breath or trouble breathing  · Your lips or nails look blue or pale    Return to the emergency department if:   · You cough up blood     · You suddenly feel lightheaded or are short of breath  · You have chest pain when you take a deep breath or cough  · You cannot think clearly  Contact your healthcare provider if:   · Your symptoms do not improve  · You have new symptoms  · You have questions or concerns about your condition or care  Follow up with your healthcare provider:  Your healthcare provider will refer you to a pulmonologist  Your pulmonologist will monitor your nodules for any change or growth  Nodules that grow quickly may require a biopsy to check for cancer  You may need to be monitored for 1 to 3 years  Write down your questions so you remember to ask them during your visits  Do not smoke:  Nicotine and other chemicals in cigarettes and cigars can cause lung damage or cancer  Stay away from others who smoke  Ask your healthcare provider for information if you or someone close to you currently smokes and needs help to quit  E-cigarettes or smokeless tobacco still contain nicotine  Talk to your healthcare provider before you use these products  © 2017 [address] [facility] Information is for End User's use only and may not be sold, redistributed or otherwise used for commercial purposes  All illustrations and images included in CareNotes® are the copyrighted property of [employer]  or [employer]  The above information is an  only  It is not intended as medical advice for individual conditions or treatments  Talk to your doctor, nurse or pharmacist before following any medical regimen to see if it is safe and effective for you  Against Medical Advice   WHAT YOU NEED TO KNOW:   Discharge against medical advice means that you choose to leave the hospital before your healthcare provider recommends that you do  Your healthcare provider may still need to diagnose or treat your condition or your treatment may not be complete     DISCHARGE INSTRUCTIONS:   Risks:  Risks of leaving the hospital before your healthcare providers recommend include the following:  · Your condition may cause other health problems if not treated properly  · You may need to be admitted to the hospital again for the same condition  · Your condition could become life-threatening  Follow up with your healthcare provider as directed:  Write down your questions so you remember to ask them during your visits  © 2017 [address] [employer] Information is for End User's use only and may not be sold, redistributed or otherwise used for commercial purposes  All illustrations and images included in CareNotes® are the copyrighted property of [employer]  or [employer]  The above information is an  only  It is not intended as medical advice for individual conditions or treatments  Talk to your doctor, nurse or pharmacist before following any medical regimen to see if it is safe and effective for you

## 2020-11-04 ENCOUNTER — HOSPITAL ENCOUNTER (EMERGENCY)
Facility: HOSPITAL | Age: 60
Discharge: HOME | End: 2020-11-04
Attending: EMERGENCY MEDICINE
Payer: COMMERCIAL

## 2020-11-04 ENCOUNTER — APPOINTMENT (EMERGENCY)
Dept: CT IMAGING | Facility: HOSPITAL | Age: 60
End: 2020-11-04
Payer: COMMERCIAL

## 2020-11-04 VITALS
SYSTOLIC BLOOD PRESSURE: 174 MMHG | TEMPERATURE: 98.3 F | RESPIRATION RATE: 18 BRPM | OXYGEN SATURATION: 99 % | DIASTOLIC BLOOD PRESSURE: 90 MMHG | HEART RATE: 100 BPM

## 2020-11-04 DIAGNOSIS — R10.9 ABDOMINAL PAIN: Primary | ICD-10-CM

## 2020-11-04 DIAGNOSIS — E86.0 DEHYDRATION: ICD-10-CM

## 2020-11-04 DIAGNOSIS — R11.2 NAUSEA AND VOMITING: ICD-10-CM

## 2020-11-04 LAB
ALBUMIN SERPL BCP-MCNC: 4.4 G/DL (ref 3.5–5)
ALP SERPL-CCNC: 74 U/L (ref 46–116)
ALT SERPL W P-5'-P-CCNC: 47 U/L (ref 12–78)
ANION GAP SERPL CALCULATED.3IONS-SCNC: 12 MMOL/L (ref 4–13)
ANION GAP SERPL CALCULATED.3IONS-SCNC: 16 MMOL/L (ref 4–13)
AST SERPL W P-5'-P-CCNC: 62 U/L (ref 5–45)
BASOPHILS # BLD AUTO: 0.05 THOUSANDS/ΜL (ref 0–0.1)
BASOPHILS NFR BLD AUTO: 1 % (ref 0–1)
BILIRUB SERPL-MCNC: 1.6 MG/DL (ref 0.2–1)
BUN SERPL-MCNC: 10 MG/DL (ref 5–25)
BUN SERPL-MCNC: 11 MG/DL (ref 5–25)
CALCIUM SERPL-MCNC: 8.7 MG/DL (ref 8.3–10.1)
CALCIUM SERPL-MCNC: 9.7 MG/DL (ref 8.3–10.1)
CHLORIDE SERPL-SCNC: 93 MMOL/L (ref 100–108)
CHLORIDE SERPL-SCNC: 96 MMOL/L (ref 100–108)
CO2 SERPL-SCNC: 22 MMOL/L (ref 21–32)
CO2 SERPL-SCNC: 23 MMOL/L (ref 21–32)
CREAT SERPL-MCNC: 1.42 MG/DL (ref 0.6–1.3)
CREAT SERPL-MCNC: 1.85 MG/DL (ref 0.6–1.3)
EOSINOPHIL # BLD AUTO: 0.03 THOUSAND/ΜL (ref 0–0.61)
EOSINOPHIL NFR BLD AUTO: 1 % (ref 0–6)
ERYTHROCYTE [DISTWIDTH] IN BLOOD BY AUTOMATED COUNT: 14.9 % (ref 11.6–15.1)
GFR SERPL CREATININE-BSD FRML MDRD: 45 ML/MIN/1.73SQ M
GFR SERPL CREATININE-BSD FRML MDRD: 62 ML/MIN/1.73SQ M
GLUCOSE SERPL-MCNC: 104 MG/DL (ref 65–140)
GLUCOSE SERPL-MCNC: 159 MG/DL (ref 65–140)
HCT VFR BLD AUTO: 48.2 % (ref 36.5–49.3)
HGB BLD-MCNC: 16 G/DL (ref 12–17)
IMM GRANULOCYTES # BLD AUTO: 0.02 THOUSAND/UL (ref 0–0.2)
IMM GRANULOCYTES NFR BLD AUTO: 0 % (ref 0–2)
LIPASE SERPL-CCNC: 119 U/L (ref 73–393)
LYMPHOCYTES # BLD AUTO: 1.11 THOUSANDS/ΜL (ref 0.6–4.47)
LYMPHOCYTES NFR BLD AUTO: 19 % (ref 14–44)
MCH RBC QN AUTO: 28.9 PG (ref 26.8–34.3)
MCHC RBC AUTO-ENTMCNC: 33.2 G/DL (ref 31.4–37.4)
MCV RBC AUTO: 87 FL (ref 82–98)
MONOCYTES # BLD AUTO: 0.57 THOUSAND/ΜL (ref 0.17–1.22)
MONOCYTES NFR BLD AUTO: 10 % (ref 4–12)
NEUTROPHILS # BLD AUTO: 4.08 THOUSANDS/ΜL (ref 1.85–7.62)
NEUTS SEG NFR BLD AUTO: 69 % (ref 43–75)
NRBC BLD AUTO-RTO: 0 /100 WBCS
PLATELET # BLD AUTO: 267 THOUSANDS/UL (ref 149–390)
PMV BLD AUTO: 9.9 FL (ref 8.9–12.7)
POTASSIUM SERPL-SCNC: 3.2 MMOL/L (ref 3.5–5.3)
POTASSIUM SERPL-SCNC: 3.6 MMOL/L (ref 3.5–5.3)
PROT SERPL-MCNC: 8.5 G/DL (ref 6.4–8.2)
RBC # BLD AUTO: 5.54 MILLION/UL (ref 3.88–5.62)
SODIUM SERPL-SCNC: 131 MMOL/L (ref 136–145)
SODIUM SERPL-SCNC: 131 MMOL/L (ref 136–145)
WBC # BLD AUTO: 5.86 THOUSAND/UL (ref 4.31–10.16)

## 2020-11-04 PROCEDURE — 96375 TX/PRO/DX INJ NEW DRUG ADDON: CPT

## 2020-11-04 PROCEDURE — 74177 CT ABD & PELVIS W/CONTRAST: CPT

## 2020-11-04 PROCEDURE — 99284 EMERGENCY DEPT VISIT MOD MDM: CPT

## 2020-11-04 PROCEDURE — 80048 BASIC METABOLIC PNL TOTAL CA: CPT | Performed by: EMERGENCY MEDICINE

## 2020-11-04 PROCEDURE — 96374 THER/PROPH/DIAG INJ IV PUSH: CPT

## 2020-11-04 PROCEDURE — 80053 COMPREHEN METABOLIC PANEL: CPT | Performed by: EMERGENCY MEDICINE

## 2020-11-04 PROCEDURE — 83690 ASSAY OF LIPASE: CPT | Performed by: EMERGENCY MEDICINE

## 2020-11-04 PROCEDURE — 99284 EMERGENCY DEPT VISIT MOD MDM: CPT | Performed by: EMERGENCY MEDICINE

## 2020-11-04 PROCEDURE — 36415 COLL VENOUS BLD VENIPUNCTURE: CPT | Performed by: EMERGENCY MEDICINE

## 2020-11-04 PROCEDURE — U0003 INFECTIOUS AGENT DETECTION BY NUCLEIC ACID (DNA OR RNA); SEVERE ACUTE RESPIRATORY SYNDROME CORONAVIRUS 2 (SARS-COV-2) (CORONAVIRUS DISEASE [COVID-19]), AMPLIFIED PROBE TECHNIQUE, MAKING USE OF HIGH THROUGHPUT TECHNOLOGIES AS DESCRIBED BY CMS-2020-01-R: HCPCS | Performed by: EMERGENCY MEDICINE

## 2020-11-04 PROCEDURE — 85025 COMPLETE CBC W/AUTO DIFF WBC: CPT | Performed by: EMERGENCY MEDICINE

## 2020-11-04 PROCEDURE — 96361 HYDRATE IV INFUSION ADD-ON: CPT

## 2020-11-04 PROCEDURE — G1004 CDSM NDSC: HCPCS

## 2020-11-04 RX ORDER — ONDANSETRON 4 MG/1
4 TABLET, FILM COATED ORAL EVERY 6 HOURS PRN
Qty: 12 TABLET | Refills: 0 | Status: SHIPPED | OUTPATIENT
Start: 2020-11-04 | End: 2020-11-07

## 2020-11-04 RX ORDER — ONDANSETRON 2 MG/ML
4 INJECTION INTRAMUSCULAR; INTRAVENOUS ONCE
Status: COMPLETED | OUTPATIENT
Start: 2020-11-04 | End: 2020-11-04

## 2020-11-04 RX ORDER — POTASSIUM CHLORIDE 20 MEQ/1
40 TABLET, EXTENDED RELEASE ORAL ONCE
Status: COMPLETED | OUTPATIENT
Start: 2020-11-04 | End: 2020-11-04

## 2020-11-04 RX ORDER — KETOROLAC TROMETHAMINE 30 MG/ML
15 INJECTION, SOLUTION INTRAMUSCULAR; INTRAVENOUS ONCE
Status: COMPLETED | OUTPATIENT
Start: 2020-11-04 | End: 2020-11-04

## 2020-11-04 RX ORDER — DICYCLOMINE HCL 20 MG
20 TABLET ORAL ONCE
Status: COMPLETED | OUTPATIENT
Start: 2020-11-04 | End: 2020-11-04

## 2020-11-04 RX ADMIN — SODIUM CHLORIDE 1000 ML: 0.9 INJECTION, SOLUTION INTRAVENOUS at 14:14

## 2020-11-04 RX ADMIN — KETOROLAC TROMETHAMINE 15 MG: 30 INJECTION, SOLUTION INTRAMUSCULAR at 14:17

## 2020-11-04 RX ADMIN — DICYCLOMINE HYDROCHLORIDE 20 MG: 20 TABLET ORAL at 14:18

## 2020-11-04 RX ADMIN — IOHEXOL 100 ML: 350 INJECTION, SOLUTION INTRAVENOUS at 14:56

## 2020-11-04 RX ADMIN — POTASSIUM CHLORIDE 40 MEQ: 1500 TABLET, EXTENDED RELEASE ORAL at 15:07

## 2020-11-04 RX ADMIN — ONDANSETRON 4 MG: 2 INJECTION INTRAMUSCULAR; INTRAVENOUS at 14:14

## 2020-11-06 LAB — SARS-COV-2 RNA SPEC QL NAA+PROBE: NOT DETECTED
